# Patient Record
Sex: FEMALE | Race: BLACK OR AFRICAN AMERICAN | NOT HISPANIC OR LATINO | Employment: UNEMPLOYED | URBAN - METROPOLITAN AREA
[De-identification: names, ages, dates, MRNs, and addresses within clinical notes are randomized per-mention and may not be internally consistent; named-entity substitution may affect disease eponyms.]

---

## 2024-05-10 ENCOUNTER — HOSPITAL ENCOUNTER (EMERGENCY)
Facility: HOSPITAL | Age: 34
Discharge: HOME | End: 2024-05-10
Attending: EMERGENCY MEDICINE

## 2024-05-10 ENCOUNTER — APPOINTMENT (OUTPATIENT)
Dept: GENERAL RADIOLOGY | Facility: HOSPITAL | Age: 34
End: 2024-05-10

## 2024-05-10 VITALS
RESPIRATION RATE: 18 BRPM | BODY MASS INDEX: 37.5 KG/M2 | DIASTOLIC BLOOD PRESSURE: 78 MMHG | HEART RATE: 74 BPM | WEIGHT: 225.09 LBS | TEMPERATURE: 98.8 F | OXYGEN SATURATION: 99 % | SYSTOLIC BLOOD PRESSURE: 122 MMHG | HEIGHT: 65 IN

## 2024-05-10 DIAGNOSIS — M54.2 NECK PAIN: Primary | ICD-10-CM

## 2024-05-10 DIAGNOSIS — M54.12 CERVICAL RADICULOPATHY: ICD-10-CM

## 2024-05-10 PROCEDURE — 72050 X-RAY EXAM NECK SPINE 4/5VWS: CPT

## 2024-05-10 PROCEDURE — 97110 THERAPEUTIC EXERCISES: CPT | Performed by: PHYSICAL THERAPIST

## 2024-05-10 PROCEDURE — 99283 EMERGENCY DEPT VISIT LOW MDM: CPT

## 2024-05-10 PROCEDURE — 96372 THER/PROPH/DIAG INJ SC/IM: CPT

## 2024-05-10 PROCEDURE — 97161 PT EVAL LOW COMPLEX 20 MIN: CPT | Performed by: PHYSICAL THERAPIST

## 2024-05-10 PROCEDURE — 25010000002 KETOROLAC TROMETHAMINE PER 15 MG

## 2024-05-10 RX ORDER — KETOROLAC TROMETHAMINE 30 MG/ML
30 INJECTION, SOLUTION INTRAMUSCULAR; INTRAVENOUS ONCE
Status: COMPLETED | OUTPATIENT
Start: 2024-05-10 | End: 2024-05-10

## 2024-05-10 RX ORDER — METHOCARBAMOL 750 MG/1
750 TABLET, FILM COATED ORAL 3 TIMES DAILY PRN
Qty: 15 TABLET | Refills: 0 | Status: SHIPPED | OUTPATIENT
Start: 2024-05-10 | End: 2024-05-15

## 2024-05-10 RX ADMIN — KETOROLAC TROMETHAMINE 30 MG: 30 INJECTION, SOLUTION INTRAMUSCULAR; INTRAVENOUS at 13:50

## 2024-05-10 NOTE — ED PROVIDER NOTES
"Time: 1:32 PM EDT  Date of encounter:  5/10/2024  Independent Historian/Clinical History and Information was obtained by:   Patient    History is limited by: N/A    Chief Complaint: Neck pain      History of Present Illness:  Patient is a 33 y.o. year old female who presents to the emergency department for evaluation of neck pain that began several days ago after waking up.  Patient states she felt like she slept on her neck wrong.  Patient states she has mild tingling radiating down into her left second third and fourth fingers.  Patient denies any known injury/trauma.  Patient denies fever.  Patient denies any prior neck surgical history.    HPI    Patient Care Team  Primary Care Provider: Provider, No Known    Past Medical History:     No Known Allergies  History reviewed. No pertinent past medical history.  History reviewed. No pertinent surgical history.  History reviewed. No pertinent family history.    Home Medications:  Prior to Admission medications    Not on File        Social History:          Review of Systems:  Review of Systems   Constitutional:  Negative for chills and fever.   HENT:  Negative for congestion, rhinorrhea and sore throat.    Eyes:  Negative for pain and visual disturbance.   Respiratory:  Negative for apnea, cough, chest tightness and shortness of breath.    Cardiovascular:  Negative for chest pain and palpitations.   Gastrointestinal:  Negative for abdominal pain, diarrhea, nausea and vomiting.   Genitourinary:  Negative for difficulty urinating and dysuria.   Musculoskeletal:  Positive for neck pain. Negative for joint swelling and myalgias.   Skin:  Negative for color change.   Neurological:  Negative for seizures and headaches.   Psychiatric/Behavioral: Negative.     All other systems reviewed and are negative.       Physical Exam:  /88 (BP Location: Right arm, Patient Position: Lying)   Pulse 75   Temp 99.8 °F (37.7 °C) (Oral)   Resp 16   Ht 165.1 cm (65\")   Wt 102 kg " (225 lb 1.4 oz)   LMP 12/03/2023 Comment: Pt reports her periods are very abnormal  SpO2 98%   BMI 37.46 kg/m²     Physical Exam  Vitals and nursing note reviewed.   Constitutional:       General: She is not in acute distress.     Appearance: Normal appearance. She is not toxic-appearing.   HENT:      Head: Normocephalic and atraumatic.      Jaw: There is normal jaw occlusion.   Eyes:      General: Lids are normal.      Extraocular Movements: Extraocular movements intact.      Conjunctiva/sclera: Conjunctivae normal.      Pupils: Pupils are equal, round, and reactive to light.   Cardiovascular:      Rate and Rhythm: Normal rate and regular rhythm.      Pulses: Normal pulses.      Heart sounds: Normal heart sounds.   Pulmonary:      Effort: Pulmonary effort is normal. No respiratory distress.      Breath sounds: Normal breath sounds. No wheezing or rhonchi.   Abdominal:      General: Abdomen is flat.      Palpations: Abdomen is soft.      Tenderness: There is no abdominal tenderness. There is no guarding or rebound.   Musculoskeletal:         General: Tenderness (Mild C-spine tenderness appreciated upon palpation.) present. Normal range of motion.      Cervical back: Normal range of motion and neck supple.      Right lower leg: No edema.      Left lower leg: No edema.   Skin:     General: Skin is warm and dry.   Neurological:      Mental Status: She is alert and oriented to person, place, and time. Mental status is at baseline.   Psychiatric:         Mood and Affect: Mood normal.                  Procedures:  Procedures      Medical Decision Making:      Comorbidities that affect care:    None    External Notes reviewed:          The following orders were placed and all results were independently analyzed by me:  Orders Placed This Encounter   Procedures    XR Spine Cervical Complete 4 or 5 View    PT Consult: Eval & Treat Functional Mobility Below Baseline    PT Plan of Care Cert / Re-Cert       Medications Given  in the Emergency Department:  Medications   ketorolac (TORADOL) injection 30 mg (30 mg Intramuscular Given 5/10/24 1350)        ED Course:         Labs:    Lab Results (last 24 hours)       ** No results found for the last 24 hours. **             Imaging:    XR Spine Cervical Complete 4 or 5 View    Result Date: 5/10/2024  XR SPINE CERVICAL COMPLETE 4 OR 5 VW-  Date of Exam: 5/10/2024 2:04 PM  Indication: pain; M54.2-Cervicalgia  Comparison: None available.  Findings: No evidence of fracture. Straightening of normal cervical lordosis. No significant degenerative changes. Soft tissues are unremarkable.      Impression: No acute abnormality of the cervical spine.   Electronically Signed By-Charlie Traylor MD On:5/10/2024 2:17 PM         Differential Diagnosis and Discussion:    Neck Pain: The patient presents with neck pain. My differential diagnosis includes but is not limited to acute spinal epidural abscess, acute spinal epidural bleed, meningitis, musculoskeletal neck pain, spinal fracture, and osteoarthritis.     All X-rays impressions were independently interpreted by me.    MDM     X-ray cervical spine shows no acute osseous abnormality.  Patient had numbness and tingling in her digits consistent with cervical radiculopathy.  I will send patient home with muscle relaxers but emphasized the importance of not driving after taking muscle relaxers as it can be sedating.  Instructed patient to follow-up PCP this week.  Instructed patient to return to ED if she develops any new or worsening symptoms.  Patient states she understands and agrees with plan of care.      There was no evidence of nuchal rigidity.  Patient had full range of motion of the neck.          Patient Care Considerations:          Consultants/Shared Management Plan:    None    Social Determinants of Health:    Patient is independent, reliable, and has access to care.       Disposition and Care Coordination:    Discharged: The patient is suitable and  stable for discharge with no need for consideration of admission.    I have explained the patient´s condition, diagnoses and treatment plan based on the information available to me at this time. I have answered questions and addressed any concerns. The patient has a good  understanding of the patient´s diagnosis, condition, and treatment plan as can be expected at this point. The vital signs have been stable. The patient´s condition is stable and appropriate for discharge from the emergency department.      The patient will pursue further outpatient evaluation with the primary care physician or other designated or consulting physician as outlined in the discharge instructions. They are agreeable to this plan of care and follow-up instructions have been explained in detail. The patient has received these instructions in written format and has expressed an understanding of the discharge instructions. The patient is aware that any significant change in condition or worsening of symptoms should prompt an immediate return to this or the closest emergency department or call to 911.  I have explained discharge medications and the need for follow up with the patient/caretakers. This was also printed in the discharge instructions. Patient was discharged with the following medications and follow up:      Medication List      No changes were made to your prescriptions during this visit.      Provider, No Known  [facility]  [place] [address]    Call in 1 day  To schedule follow-up       Final diagnoses:   Cervical radiculopathy        ED Disposition       ED Disposition   Discharge    Condition   Stable    Comment   --               This medical record created using voice recognition software.             Santana Murray PA-C  05/10/24 1422

## 2024-05-10 NOTE — Clinical Note
[facility] EMERGENCY ROOM  [address] [place] [address]  Phone: [phone]  Fax: [phone]    Anca Prince was seen and treated in our emergency department on 5/10/2024.  She may return to work on 05/13/2024.         Thank you for choosing [facility].    Sami Aguilar MD

## 2024-05-10 NOTE — THERAPY EVALUATION
Patient Name: Anca Prince  : 1990    MRN: [id number]                              Today's Date: 5/10/2024       Admit Date: 5/10/2024    Visit Dx:     ICD-10-CM ICD-9-CM   1. Neck pain  M54.2 723.1     There is no problem list on file for this patient.    History reviewed. No pertinent past medical history.  History reviewed. No pertinent surgical history.   General Information       Row Name 05/10/24 1347          Physical Therapy Time and Intention    Document Type evaluation  -LR     Mode of Treatment individual therapy  -LR       Row Name 05/10/24 1347          General Information    Patient Profile Reviewed yes  -LR     Prior Level of Function independent:  -LR               User Key  (r) = Recorded By, (t) = Taken By, (c) = Cosigned By      Initials Name Provider Type    LR Nelda Monteiro, PT Physical Therapist                  History: Patient reports last Saturday she woke up with pain in her neck.  She denies any known injury and denies a history of neck pain.  She states the pain is going down around her shoulder blade and into her left arm.  She states yesterday she did have some left arm numbness.  She denies chest pain.  Her pain is currently 8/10.  She has taken ibuprofen to help with the pain.  Her pain is increased with moving her arm, moving her neck, and breathing.    Objective:  Posture: Forward head and rounded shoulder    Palpation: Tender to palpation at bilateral cervical paraspinals, left thoracic paraspinals, left SCM, left levator scapula    ROM:  Active Cervical ROM:  Flexion: 28°  Extension: 18°  L Side bendin°  R Side bendin°  L Rotation: 25°  R Rotation: 40°    Active Shoulder ROM:  L Shoulder ROM:  R Shoulder ROM:  Flexion: 90°   Flexion: WNL  Abduction: WNL  Abduction: WNL  External Rotation: WNL External Rotation: WNL  Internal Rotation: WNL Internal Rotation: WNL     Strength:  L Shoulder MMT:   R Shoulder MMT:  Flexion: 3+   Flexion: 5  Abduction:  5   Abduction: 5  External Rotation: 5  External Rotation: 5  Internal Rotation: 5  Internal Rotation: 5    L Elbow MMT:   R Elbow MMT:  Flexion: 5   Flexion: 5  Extension: 5   Extension: 5    L Wrist MMT:    R Wrist MMT:  Flexion: 5   Flexion: 5  Extension: 5   Extension: 5    Normal bilateral  strength    Special Tests:  Spurlings: Negative B  Cervical Distraction Test: Negative     Sensation: Bilateral upper extremity sensation intact to light touch    Assessment/Plan:   Pt presents with a diagnosis of neck pain and has decreased cervical ROM, cervical and thoracic musculature tightness, and decrease left UE ROM that are limiting her ability to move from supine to sitting, reach overhead, and lift.  The patient performed exercises to help improve cervical and thoracic mobility.  She was provided with a HEP handout.    Goals:   LTG 1: The patient will be independent in HEP in order to decrease pain and improve tolerance to functional activities.  STATUS: Met    Interventions:   Manual Therapy: Not performed    Therapeutic Exercises: Upper trap stretch (2X 20 seconds on L), levator stretch (2X 20 seconds on L), cervical flexion/extension (5X), SCM stretch (3X 20 seconds on L), seated thoracic extension    Modalities: Not performed     Outcome Measures       Row Name 05/10/24 1349          Optimal Instrument    Optimal Instrument Optimal - 3  -LR     Moving - lying to sitting 3  -LR     Reaching 3  -LR     Lifting 5  -LR     From the list, choose the 3 activities you would most like to be able to do without any difficulty Reaching;Moving -lying to sitting;Lifting  -LR     Total Score Optimal - 3 8  -LR       Row Name 05/10/24 1342          Functional Assessment    Outcome Measure Options Optimal Instrument  -LR               User Key  (r) = Recorded By, (t) = Taken By, (c) = Cosigned By      Initials Name Provider Type    Nelda Small PT Physical Therapist                   Time Calculation:   PT  Evaluation Complexity  History, PT Evaluation Complexity: no personal factors and/or comorbidities  Examination of Body Systems (PT Eval Complexity): 1-2 elements  Clinical Presentation (PT Evaluation Complexity): stable  Clinical Decision Making (PT Evaluation Complexity): low complexity  Overall Complexity (PT Evaluation Complexity): low complexity     PT Charges       Row Name 05/10/24 1348             Time Calculation    PT Received On 05/10/24  -LR         Timed Charges    95950 - PT Therapeutic Exercise Minutes 10  -LR         Untimed Charges    PT Eval/Re-eval Minutes 19  -LR         Total Minutes    Timed Charges Total Minutes 10  -LR      Untimed Charges Total Minutes 19  -LR       Total Minutes 29  -LR                User Key  (r) = Recorded By, (t) = Taken By, (c) = Cosigned By      Initials Name Provider Type    LR Nelda Monteiro, PT Physical Therapist                  Therapy Charges for Today       Code Description Service Date Service Provider Modifiers Qty    41463220668 HC PT THER PROC EA 15 MIN 5/10/2024 Nelda Monteiro, PT GP 1    57215697721 HC PT EVAL LOW COMPLEXITY 2 5/10/2024 Nelda Monteiro, PT GP 1            PT G-Codes  Outcome Measure Options: Optimal Instrument       Nelda Monteiro, PT  5/10/2024

## 2025-05-02 ENCOUNTER — HOSPITAL ENCOUNTER (EMERGENCY)
Facility: HOSPITAL | Age: 35
Discharge: HOME | End: 2025-05-02
Attending: EMERGENCY MEDICINE
Payer: MEDICAID

## 2025-05-02 VITALS
HEIGHT: 65 IN | RESPIRATION RATE: 18 BRPM | TEMPERATURE: 98.7 F | DIASTOLIC BLOOD PRESSURE: 70 MMHG | WEIGHT: 213.19 LBS | SYSTOLIC BLOOD PRESSURE: 143 MMHG | BODY MASS INDEX: 35.52 KG/M2 | HEART RATE: 77 BPM | OXYGEN SATURATION: 99 %

## 2025-05-02 DIAGNOSIS — Z3A.01 LESS THAN 8 WEEKS GESTATION OF PREGNANCY: ICD-10-CM

## 2025-05-02 DIAGNOSIS — N39.0 URINARY TRACT INFECTION WITHOUT HEMATURIA, SITE UNSPECIFIED: Primary | ICD-10-CM

## 2025-05-02 DIAGNOSIS — R11.0 NAUSEA: ICD-10-CM

## 2025-05-02 LAB
ALBUMIN SERPL-MCNC: 4.1 G/DL (ref 3.5–5.2)
ALBUMIN/GLOB SERPL: 1.4 G/DL
ALP SERPL-CCNC: 53 U/L (ref 39–117)
ALT SERPL W P-5'-P-CCNC: 12 U/L (ref 1–33)
ANION GAP SERPL CALCULATED.3IONS-SCNC: 11 MMOL/L (ref 5–15)
AST SERPL-CCNC: 15 U/L (ref 1–32)
BACTERIA UR QL AUTO: ABNORMAL /HPF
BASOPHILS # BLD AUTO: 0.05 10*3/MM3 (ref 0–0.2)
BASOPHILS NFR BLD AUTO: 0.6 % (ref 0–1.5)
BILIRUB SERPL-MCNC: 0.2 MG/DL (ref 0–1.2)
BILIRUB UR QL STRIP: NEGATIVE
BUN SERPL-MCNC: 7 MG/DL (ref 6–20)
BUN/CREAT SERPL: 10.9 (ref 7–25)
CALCIUM SPEC-SCNC: 8.8 MG/DL (ref 8.6–10.5)
CHLORIDE SERPL-SCNC: 100 MMOL/L (ref 98–107)
CLARITY UR: ABNORMAL
CO2 SERPL-SCNC: 23 MMOL/L (ref 22–29)
COLOR UR: YELLOW
CREAT SERPL-MCNC: 0.64 MG/DL (ref 0.57–1)
DEPRECATED RDW RBC AUTO: 38.9 FL (ref 37–54)
EGFRCR SERPLBLD CKD-EPI 2021: 119.8 ML/MIN/1.73
EOSINOPHIL # BLD AUTO: 0.13 10*3/MM3 (ref 0–0.4)
EOSINOPHIL NFR BLD AUTO: 1.5 % (ref 0.3–6.2)
ERYTHROCYTE [DISTWIDTH] IN BLOOD BY AUTOMATED COUNT: 11.8 % (ref 12.3–15.4)
GLOBULIN UR ELPH-MCNC: 2.9 GM/DL
GLUCOSE SERPL-MCNC: 84 MG/DL (ref 65–99)
GLUCOSE UR STRIP-MCNC: NEGATIVE MG/DL
HCG INTACT+B SERPL-ACNC: NORMAL MIU/ML
HCT VFR BLD AUTO: 35.7 % (ref 34–46.6)
HGB BLD-MCNC: 12.1 G/DL (ref 12–15.9)
HGB UR QL STRIP.AUTO: NEGATIVE
HYALINE CASTS UR QL AUTO: ABNORMAL /LPF
IMM GRANULOCYTES # BLD AUTO: 0.02 10*3/MM3 (ref 0–0.05)
IMM GRANULOCYTES NFR BLD AUTO: 0.2 % (ref 0–0.5)
KETONES UR QL STRIP: NEGATIVE
LEUKOCYTE ESTERASE UR QL STRIP.AUTO: ABNORMAL
LYMPHOCYTES # BLD AUTO: 2.02 10*3/MM3 (ref 0.7–3.1)
LYMPHOCYTES NFR BLD AUTO: 24 % (ref 19.6–45.3)
MCH RBC QN AUTO: 30.6 PG (ref 26.6–33)
MCHC RBC AUTO-ENTMCNC: 33.9 G/DL (ref 31.5–35.7)
MCV RBC AUTO: 90.4 FL (ref 79–97)
MONOCYTES # BLD AUTO: 0.67 10*3/MM3 (ref 0.1–0.9)
MONOCYTES NFR BLD AUTO: 8 % (ref 5–12)
NEUTROPHILS NFR BLD AUTO: 5.53 10*3/MM3 (ref 1.7–7)
NEUTROPHILS NFR BLD AUTO: 65.7 % (ref 42.7–76)
NITRITE UR QL STRIP: NEGATIVE
NRBC BLD AUTO-RTO: 0 /100 WBC (ref 0–0.2)
PH UR STRIP.AUTO: 6.5 [PH] (ref 5–8)
PLATELET # BLD AUTO: 185 10*3/MM3 (ref 140–450)
PMV BLD AUTO: 10.7 FL (ref 6–12)
POTASSIUM SERPL-SCNC: 3.5 MMOL/L (ref 3.5–5.2)
PROT SERPL-MCNC: 7 G/DL (ref 6–8.5)
PROT UR QL STRIP: NEGATIVE
RBC # BLD AUTO: 3.95 10*6/MM3 (ref 3.77–5.28)
RBC # UR STRIP: ABNORMAL /HPF
REF LAB TEST METHOD: ABNORMAL
SODIUM SERPL-SCNC: 134 MMOL/L (ref 136–145)
SP GR UR STRIP: 1.01 (ref 1–1.03)
SQUAMOUS #/AREA URNS HPF: ABNORMAL /HPF
UROBILINOGEN UR QL STRIP: ABNORMAL
WBC # UR STRIP: ABNORMAL /HPF
WBC NRBC COR # BLD AUTO: 8.42 10*3/MM3 (ref 3.4–10.8)
WHOLE BLOOD HOLD COAG: NORMAL

## 2025-05-02 PROCEDURE — 81001 URINALYSIS AUTO W/SCOPE: CPT

## 2025-05-02 PROCEDURE — 87086 URINE CULTURE/COLONY COUNT: CPT

## 2025-05-02 PROCEDURE — 25010000002 ONDANSETRON PER 1 MG

## 2025-05-02 PROCEDURE — 84702 CHORIONIC GONADOTROPIN TEST: CPT

## 2025-05-02 PROCEDURE — 80053 COMPREHEN METABOLIC PANEL: CPT

## 2025-05-02 PROCEDURE — 96374 THER/PROPH/DIAG INJ IV PUSH: CPT

## 2025-05-02 PROCEDURE — 85025 COMPLETE CBC W/AUTO DIFF WBC: CPT

## 2025-05-02 PROCEDURE — 99283 EMERGENCY DEPT VISIT LOW MDM: CPT

## 2025-05-02 RX ORDER — CEPHALEXIN 500 MG/1
500 CAPSULE ORAL 4 TIMES DAILY
Qty: 28 CAPSULE | Refills: 0 | Status: SHIPPED | OUTPATIENT
Start: 2025-05-02 | End: 2025-05-09

## 2025-05-02 RX ORDER — ONDANSETRON 2 MG/ML
4 INJECTION INTRAMUSCULAR; INTRAVENOUS ONCE
Status: COMPLETED | OUTPATIENT
Start: 2025-05-02 | End: 2025-05-02

## 2025-05-02 RX ADMIN — ONDANSETRON 4 MG: 2 INJECTION INTRAMUSCULAR; INTRAVENOUS at 11:47

## 2025-05-02 NOTE — ED PROVIDER NOTES
Time: 11:38 AM EDT  Date of encounter:  5/2/2025  Independent Historian/Clinical History and Information was obtained by:   Patient    History is limited by: N/A    Chief Complaint: nausea        History of Present Illness:  Patient is a 34 y.o. year old female who presents to the emergency department for evaluation of nausea ongoing for 4 days and dizziness that began yesterday.  Patient also reports positive pregnancy test at home 10 days ago.  Patient reports unknown last menstrual period due to irregular menstrual cycles.  Denies any abdominal pain, vaginal bleeding, vaginal discharge.  Denies any cough, congestion, fevers, chills, body aches.      Patient Care Team  Primary Care Provider: Provider, No Known    Past Medical History:     No Known Allergies  History reviewed. No pertinent past medical history.  History reviewed. No pertinent surgical history.  History reviewed. No pertinent family history.    Home Medications:  Prior to Admission medications    Not on File        Social History:   Social History     Tobacco Use    Smoking status: Never    Smokeless tobacco: Never   Substance Use Topics    Alcohol use: Not Currently         Review of Systems:  Review of Systems   Constitutional:  Negative for chills, fatigue and fever.   HENT:  Negative for ear pain, rhinorrhea and sore throat.    Eyes:  Negative for visual disturbance.   Respiratory:  Negative for cough and shortness of breath.    Cardiovascular:  Negative for chest pain.   Gastrointestinal:  Positive for nausea. Negative for abdominal pain, diarrhea and vomiting.   Genitourinary:  Negative for difficulty urinating.   Musculoskeletal:  Negative for arthralgias, back pain and myalgias.   Skin:  Negative for rash.   Neurological:  Positive for dizziness. Negative for light-headedness and headaches.   Hematological:  Negative for adenopathy.   Psychiatric/Behavioral: Negative.          Physical Exam:  /85 (BP Location: Right arm, Patient  "Position: Lying)   Pulse 65   Temp 98.7 °F (37.1 °C) (Oral)   Resp 14   Ht 165.1 cm (65\")   Wt 96.7 kg (213 lb 3 oz)   LMP  (LMP Unknown)   SpO2 100%   BMI 35.48 kg/m²     Physical Exam  Vitals and nursing note reviewed.   Constitutional:       General: She is not in acute distress.     Appearance: Normal appearance. She is not toxic-appearing.   HENT:      Head: Normocephalic and atraumatic.      Nose: Nose normal.      Mouth/Throat:      Mouth: Mucous membranes are moist.   Eyes:      Conjunctiva/sclera: Conjunctivae normal.   Cardiovascular:      Rate and Rhythm: Normal rate and regular rhythm.      Pulses: Normal pulses.      Heart sounds: Normal heart sounds.   Pulmonary:      Effort: Pulmonary effort is normal.      Breath sounds: Normal breath sounds.   Abdominal:      General: Bowel sounds are normal.      Palpations: Abdomen is soft.      Tenderness: There is no abdominal tenderness.   Musculoskeletal:         General: Normal range of motion.      Cervical back: Normal range of motion.   Skin:     General: Skin is warm and dry.   Neurological:      General: No focal deficit present.      Mental Status: She is alert and oriented to person, place, and time.   Psychiatric:         Mood and Affect: Mood normal.         Behavior: Behavior normal.         Thought Content: Thought content normal.         Judgment: Judgment normal.                    Medical Decision Making:      Comorbidities that affect care:    Pregnancy    External Notes reviewed:    None      The following orders were placed and all results were independently analyzed by me:  Orders Placed This Encounter   Procedures    Urine Culture - Urine, Urine, Clean Catch    Comprehensive Metabolic Panel    hCG, Quantitative, Pregnancy    Urinalysis With Microscopic If Indicated (No Culture) - Urine, Clean Catch    CBC Auto Differential    Urinalysis, Microscopic Only - Urine, Clean Catch    Ambulatory Referral to Obstetrics / Gynecology    CBC & " Differential    Extra Tubes    Light Blue Top       Medications Given in the Emergency Department:  Medications   ondansetron (ZOFRAN) injection 4 mg (4 mg Intravenous Given 5/2/25 1147)        ED Course:         Labs:    Lab Results (last 24 hours)       Procedure Component Value Units Date/Time    CBC & Differential [[id number]]  (Abnormal) Collected: 05/02/25 1141    Specimen: Blood from Arm, Right Updated: 05/02/25 1151    Narrative:      The following orders were created for panel order CBC & Differential.  Procedure                               Abnormality         Status                     ---------                               -----------         ------                     CBC Auto Differential[[id number]]        Abnormal            Final result                 Please view results for these tests on the individual orders.    Comprehensive Metabolic Panel [[id number]]  (Abnormal) Collected: 05/02/25 1141    Specimen: Blood from Arm, Right Updated: 05/02/25 1211     Glucose 84 mg/dL      BUN 7 mg/dL      Creatinine 0.64 mg/dL      Sodium 134 mmol/L      Potassium 3.5 mmol/L      Chloride 100 mmol/L      CO2 23.0 mmol/L      Calcium 8.8 mg/dL      Total Protein 7.0 g/dL      Albumin 4.1 g/dL      ALT (SGPT) 12 U/L      AST (SGOT) 15 U/L      Alkaline Phosphatase 53 U/L      Total Bilirubin 0.2 mg/dL      Globulin 2.9 gm/dL      A/G Ratio 1.4 g/dL      BUN/Creatinine Ratio 10.9     Anion Gap 11.0 mmol/L      eGFR 119.8 mL/min/1.73     Narrative:      GFR Categories in Chronic Kidney Disease (CKD)              GFR Category          GFR (mL/min/1.73)    Interpretation  G1                    90 or greater        Normal or high (1)  G2                    60-89                Mild decrease (1)  G3a                   45-59                Mild to moderate decrease  G3b                   30-44                Moderate to severe decrease  G4                    15-29                Severe decrease  G5                    14  or less           Kidney failure    (1)In the absence of evidence of kidney disease, neither GFR category G1 or G2 fulfill the criteria for CKD.    eGFR calculation 2021 CKD-EPI creatinine equation, which does not include race as a factor    hCG, Quantitative, Pregnancy [[id number]] Collected: 05/02/25 1141    Specimen: Blood from Arm, Right Updated: 05/02/25 1225     HCG Quantitative 97,243.00 mIU/mL     Narrative:      HCG Ranges by Gestational Age    Females - non-pregnant premenopausal   </= 1mIU/mL HCG  Females - postmenopausal               </= 7mIU/mL HCG    3 Weeks       5.4   -      72 mIU/mL  4 Weeks      10.2   -     708 mIU/mL  5 Weeks       217   -   8,245 mIU/mL  6 Weeks       152   -  32,177 mIU/mL  7 Weeks     4,059   - 153,767 mIU/mL  8 Weeks    31,366   - 149,094 mIU/mL  9 Weeks    59,109   - 135,901 mIU/mL  10 Weeks   44,186   - 170,409 mIU/mL  12 Weeks   27,107   - 201,615 mIU/mL  14 Weeks   24,302   -  93,646 mIU/mL  15 Weeks   12,540   -  69,747 mIU/mL  16 Weeks    8,904   -  55,332 mIU/mL  17 Weeks    8,240   -  51,793 mIU/mL  18 Weeks    9,649   -  55,271 mIU/mL      CBC Auto Differential [[id number]]  (Abnormal) Collected: 05/02/25 1141    Specimen: Blood from Arm, Right Updated: 05/02/25 1151     WBC 8.42 10*3/mm3      RBC 3.95 10*6/mm3      Hemoglobin 12.1 g/dL      Hematocrit 35.7 %      MCV 90.4 fL      MCH 30.6 pg      MCHC 33.9 g/dL      RDW 11.8 %      RDW-SD 38.9 fl      MPV 10.7 fL      Platelets 185 10*3/mm3      Neutrophil % 65.7 %      Lymphocyte % 24.0 %      Monocyte % 8.0 %      Eosinophil % 1.5 %      Basophil % 0.6 %      Immature Grans % 0.2 %      Neutrophils, Absolute 5.53 10*3/mm3      Lymphocytes, Absolute 2.02 10*3/mm3      Monocytes, Absolute 0.67 10*3/mm3      Eosinophils, Absolute 0.13 10*3/mm3      Basophils, Absolute 0.05 10*3/mm3      Immature Grans, Absolute 0.02 10*3/mm3      nRBC 0.0 /100 WBC     Urinalysis With Microscopic If Indicated (No Culture) - Urine,  Clean Catch [[id number]]  (Abnormal) Collected: 05/02/25 1210    Specimen: Urine, Clean Catch Updated: 05/02/25 1225     Color, UA Yellow     Appearance, UA Cloudy     pH, UA 6.5     Specific Gravity, UA 1.007     Glucose, UA Negative     Ketones, UA Negative     Bilirubin, UA Negative     Blood, UA Negative     Protein, UA Negative     Leuk Esterase, UA Large (3+)     Nitrite, UA Negative     Urobilinogen, UA 0.2 E.U./dL    Urinalysis, Microscopic Only - Urine, Clean Catch [[id number]]  (Abnormal) Collected: 05/02/25 1210    Specimen: Urine, Clean Catch Updated: 05/02/25 1225     RBC, UA 0-2 /HPF      WBC, UA 21-50 /HPF      Bacteria, UA 1+ /HPF      Squamous Epithelial Cells, UA 13-20 /HPF      Hyaline Casts, UA None Seen /LPF      Methodology Automated Microscopy    Urine Culture - Urine, Urine, Clean Catch [[id number]] Collected: 05/02/25 1210    Specimen: Urine, Clean Catch Updated: 05/02/25 1235             Imaging:    No Radiology Exams Resulted Within Past 24 Hours      Differential Diagnosis and Discussion:    Vomiting: Differential diagnosis includes but is not limited to migraine, labyrinthine disorders, psychogenic, metabolic and endocrine causes, peptic ulcer, gastric outlet obstruction, gastritis, gastroenteritis, appendicitis, intestinal obstruction, paralytic ileus, food poisoning, cholecystitis, acute hepatitis, acute pancreatitis, acute febrile illness, and myocardial infarction.    PROCEDURES:    Labs were collected in the emergency department and all labs were reviewed and interpreted by me.    No orders to display       Procedures    MDM                     Patient Care Considerations:    Considered OB ultrasound however patient not experiencing any abdominal pain, vaginal bleeding or discharge today.      Consultants/Shared Management Plan:    SHARED VISIT: I have discussed the case with my supervising physician, Dr. Aguilar who states patient may discharge home with antibiotics and follow-up with  OB. The substantive portion of the medical decision was made by the attesting physician who made or approve the management plan and will take responsibility for the patient.  Clinical findings were discussed and ultimate disposition was made in consult with supervising physician.    Social Determinants of Health:    Patient is independent, reliable, and has access to care.       Disposition and Care Coordination:    Discharged: The patient is suitable and stable for discharge with no need for consideration of admission.    I have explained the patient´s condition, diagnoses and treatment plan based on the information available to me at this time. I have answered questions and addressed any concerns. The patient has a good  understanding of the patient´s diagnosis, condition, and treatment plan as can be expected at this point. The vital signs have been stable. The patient´s condition is stable and appropriate for discharge from the emergency department.      The patient will pursue further outpatient evaluation with the primary care physician or other designated or consulting physician as outlined in the discharge instructions. They are agreeable to this plan of care and follow-up instructions have been explained in detail. The patient has received these instructions in written format and has expressed an understanding of the discharge instructions. The patient is aware that any significant change in condition or worsening of symptoms should prompt an immediate return to this or the closest emergency department or call to 911.  I have explained discharge medications and the need for follow up with the patient/caretakers. This was also printed in the discharge instructions. Patient was discharged with the following medications and follow up:      Medication List        New Prescriptions      cephalexin 500 MG capsule  Commonly known as: KEFLEX  Take 1 capsule by mouth 4 (Four) Times a Day for 7 days.                Where to Get Your Medications        These medications were sent to [facility]/pharmacy #[id number] - [place], [place] - [address] - [phone]  - [phone]  [address] [place] [address]      Hours: 24-hours Phone: [phone]   cephalexin 500 MG capsule      Provider, No Known  [facility]  [place] [address]    Go to   As needed    [facility]  [address] [place] [address]  [phone]  Schedule an appointment as soon as possible for a visit          Final diagnoses:   Urinary tract infection without hematuria, site unspecified   Less than 8 weeks gestation of pregnancy   Nausea        ED Disposition       ED Disposition   Discharge    Condition   Stable    Comment   --               This medical record created using voice recognition software.             Delia Andrews, APRN  05/02/25 9201

## 2025-05-02 NOTE — DISCHARGE INSTRUCTIONS
Someone should contact you to set up an appointment with an obstetrician.  If you not hear from them you may contact the number on this packet to schedule an appointment.  Return to the ED for any new or worsening concerning symptoms.  You have been pikes for urinary tract infection you may  at your pharmacy today and begin taking.

## 2025-05-02 NOTE — ED PROVIDER NOTES
"SHARED VISIT NOTE:    Patient is 34 y.o. year old female that presents to the ED for evaluation of pregnant with nausea vomiting.     Physical Exam    ED Course:    /78 (BP Location: Left arm, Patient Position: Sitting)   Pulse 68   Temp 98 °F (36.7 °C) (Oral)   Resp 16   Ht 165.1 cm (65\")   Wt 96.7 kg (213 lb 3 oz)   LMP  (LMP Unknown)   SpO2 99%   BMI 35.48 kg/m²   Results for orders placed or performed during the hospital encounter of 05/02/25   Comprehensive Metabolic Panel    Collection Time: 05/02/25 11:41 AM    Specimen: Arm, Right; Blood   Result Value Ref Range    Glucose 84 65 - 99 mg/dL    BUN 7 6 - 20 mg/dL    Creatinine 0.64 0.57 - 1.00 mg/dL    Sodium 134 (L) 136 - 145 mmol/L    Potassium 3.5 3.5 - 5.2 mmol/L    Chloride 100 98 - 107 mmol/L    CO2 23.0 22.0 - 29.0 mmol/L    Calcium 8.8 8.6 - 10.5 mg/dL    Total Protein 7.0 6.0 - 8.5 g/dL    Albumin 4.1 3.5 - 5.2 g/dL    ALT (SGPT) 12 1 - 33 U/L    AST (SGOT) 15 1 - 32 U/L    Alkaline Phosphatase 53 39 - 117 U/L    Total Bilirubin 0.2 0.0 - 1.2 mg/dL    Globulin 2.9 gm/dL    A/G Ratio 1.4 g/dL    BUN/Creatinine Ratio 10.9 7.0 - 25.0    Anion Gap 11.0 5.0 - 15.0 mmol/L    eGFR 119.8 >60.0 mL/min/1.73   hCG, Quantitative, Pregnancy    Collection Time: 05/02/25 11:41 AM    Specimen: Arm, Right; Blood   Result Value Ref Range    HCG Quantitative 97,243.00 mIU/mL   CBC Auto Differential    Collection Time: 05/02/25 11:41 AM    Specimen: Arm, Right; Blood   Result Value Ref Range    WBC 8.42 3.40 - 10.80 10*3/mm3    RBC 3.95 3.77 - 5.28 10*6/mm3    Hemoglobin 12.1 12.0 - 15.9 g/dL    Hematocrit 35.7 34.0 - 46.6 %    MCV 90.4 79.0 - 97.0 fL    MCH 30.6 26.6 - 33.0 pg    MCHC 33.9 31.5 - 35.7 g/dL    RDW 11.8 (L) 12.3 - 15.4 %    RDW-SD 38.9 37.0 - 54.0 fl    MPV 10.7 6.0 - 12.0 fL    Platelets 185 140 - 450 10*3/mm3    Neutrophil % 65.7 42.7 - 76.0 %    Lymphocyte % 24.0 19.6 - 45.3 %    Monocyte % 8.0 5.0 - 12.0 %    Eosinophil % 1.5 0.3 - 6.2 % "    Basophil % 0.6 0.0 - 1.5 %    Immature Grans % 0.2 0.0 - 0.5 %    Neutrophils, Absolute 5.53 1.70 - 7.00 10*3/mm3    Lymphocytes, Absolute 2.02 0.70 - 3.10 10*3/mm3    Monocytes, Absolute 0.67 0.10 - 0.90 10*3/mm3    Eosinophils, Absolute 0.13 0.00 - 0.40 10*3/mm3    Basophils, Absolute 0.05 0.00 - 0.20 10*3/mm3    Immature Grans, Absolute 0.02 0.00 - 0.05 10*3/mm3    nRBC 0.0 0.0 - 0.2 /100 WBC   Light Blue Top    Collection Time: 05/02/25 11:41 AM   Result Value Ref Range    Extra Tube Hold for add-ons.    Urinalysis With Microscopic If Indicated (No Culture) - Urine, Clean Catch    Collection Time: 05/02/25 12:10 PM    Specimen: Urine, Clean Catch   Result Value Ref Range    Color, UA Yellow Yellow, Straw    Appearance, UA Cloudy (A) Clear    pH, UA 6.5 5.0 - 8.0    Specific Gravity, UA 1.007 1.005 - 1.030    Glucose, UA Negative Negative    Ketones, UA Negative Negative    Bilirubin, UA Negative Negative    Blood, UA Negative Negative    Protein, UA Negative Negative    Leuk Esterase, UA Large (3+) (A) Negative    Nitrite, UA Negative Negative    Urobilinogen, UA 0.2 E.U./dL 0.2 - 1.0 E.U./dL   Urinalysis, Microscopic Only - Urine, Clean Catch    Collection Time: 05/02/25 12:10 PM    Specimen: Urine, Clean Catch   Result Value Ref Range    RBC, UA 0-2 None Seen, 0-2 /HPF    WBC, UA 21-50 (A) None Seen, 0-2 /HPF    Bacteria, UA 1+ (A) None Seen /HPF    Squamous Epithelial Cells, UA 13-20 (A) None Seen, 0-2 /HPF    Hyaline Casts, UA None Seen None Seen /LPF    Methodology Automated Microscopy      Medications   ondansetron (ZOFRAN) injection 4 mg (4 mg Intravenous Given 5/2/25 1147)     No results found.    MDM:    Procedures    Labs were collected in the emergency department and all labs were reviewed and interpreted by me.          SHARED VISIT ATTESTATION:    This visit was performed by both myself and an APC.  I performed the substantive portion of the medical decision making. The management plan was made  or approved by me, and I take responsibility for patient management.           Sami Aguilar MD  12:39 EDT  05/02/25         Sami Aguilar MD  05/02/25 1259

## 2025-05-02 NOTE — Clinical Note
[facility] EMERGENCY ROOM  [address] [place] [address]  Phone: [phone]  Fax: [phone]    Anca Prince was seen and treated in our emergency department on 5/2/2025.  She may return to work on 05/07/2025.         Thank you for choosing [facility].    Delia Andrews, APRN

## 2025-05-03 LAB — BACTERIA SPEC AEROBE CULT: NORMAL

## 2025-05-13 ENCOUNTER — OFFICE VISIT (OUTPATIENT)
Dept: OBSTETRICS AND GYNECOLOGY | Age: 35
End: 2025-05-13
Payer: MEDICAID

## 2025-05-13 VITALS
SYSTOLIC BLOOD PRESSURE: 116 MMHG | HEART RATE: 91 BPM | DIASTOLIC BLOOD PRESSURE: 74 MMHG | WEIGHT: 206 LBS | BODY MASS INDEX: 34.32 KG/M2 | HEIGHT: 65 IN

## 2025-05-13 DIAGNOSIS — Z32.00 ENCOUNTER FOR CONFIRMATION OF PREGNANCY TEST RESULT WITH PHYSICAL EXAMINATION: Primary | ICD-10-CM

## 2025-05-13 DIAGNOSIS — O21.9 NAUSEA AND VOMITING IN PREGNANCY: ICD-10-CM

## 2025-05-13 RX ORDER — PRENATAL VIT NO.130/IRON/FOLIC 27MG-0.8MG
1 TABLET ORAL DAILY
Qty: 30 EACH | Refills: 0 | Status: SHIPPED | OUTPATIENT
Start: 2025-05-13

## 2025-05-13 RX ORDER — ONDANSETRON 4 MG/1
4 TABLET, ORALLY DISINTEGRATING ORAL EVERY 8 HOURS PRN
Qty: 30 TABLET | Refills: 3 | Status: SHIPPED | OUTPATIENT
Start: 2025-05-13

## 2025-05-13 NOTE — PROGRESS NOTES
"[facility]  Gynecological Visit    CC: Follow-up ED    Subjective:   34 y.o. who presents in follow-up from the emergency department due to pregnancy related nausea and vomiting.  The patient denies any vaginal bleeding or pelvic pain.  She states she had a very difficult first pregnancy due to difficulty continuing work.  She denies any complication during delivery.  She reports that she is already missing work due to the nausea and vomiting and needs work restrictions.  She is also requesting accommodation for the ability to sit at work    History:   Past medical history, medications, allergies, surgical history, social history, and obstetrical history all reviewed and updated.    Last Completed Pap Smear    This patient has no relevant Health Maintenance data.       Objective:/74   Pulse 91   Ht 165.1 cm (65\")   Wt 93.4 kg (206 lb)   LMP  (LMP Unknown)   Breastfeeding No   BMI 34.28 kg/m²     Physical Exam  Vitals and nursing note reviewed.   Constitutional:       General: She is not in acute distress.     Appearance: Normal appearance. She is not ill-appearing.   HENT:      Head: Normocephalic and atraumatic.   Neck:      Thyroid: No thyroid mass or thyromegaly.   Abdominal:      General: Abdomen is flat. There is no distension.      Palpations: Abdomen is soft. There is no mass.      Tenderness: There is no abdominal tenderness. There is no guarding or rebound.   Musculoskeletal:         General: No swelling.      Right lower leg: No edema.      Left lower leg: No edema.   Skin:     General: Skin is warm and dry.      Findings: No rash.   Neurological:      Mental Status: She is alert and oriented to person, place, and time.   Psychiatric:         Mood and Affect: Mood normal.         Behavior: Behavior normal.         Thought Content: Thought content normal.     Bedside transabdominal ultrasound: There is a viable mejia intrauterine gestation noted within the uterine " cavity.  Within the uterine cavity there is a gestational sac, yolk sac and fetal pole.  Cardiac activity is noted to be normal.  No gross abnormalities are noted.    Labs 5/2/2025 reviewed including a CMP, hCG, CBC, and urinalysis    Assessment and Plan:  Diagnoses and all orders for this visit:    1. Encounter for confirmation of pregnancy test result with physical examination (Primary)  Assessment & Plan:  Today's findings were reviewed with the patient.  There is a viable mejia intrauterine gestation noted.  A prenatal vitamin was prescribed and the patient encouraged to start right away.  Schedule dating ultrasound  Schedule initial OB visit  A note was provided for restrictions including lifting restriction of no more than 35 pounds.  Push and pull restriction of no more than 50 pounds.  And accommodation was also requested to allow the patient to sit during work.    Orders:  -     US Ob < 14 Weeks Single or First Gestation; Future  -     Prenatal Vit-Fe Fumarate-FA (Prenatal Vitamins) 27-0.8 MG tablet; Take 1 tablet by mouth Daily.  Dispense: 30 each; Refill: 0    2. Nausea and vomiting in pregnancy  Assessment & Plan:  The patient started on Zofran in the ER.  She reports this is helping.  Continue Zofran 4 mg every 8 hours as needed for nausea and vomiting.  The risk, benefits, and alternatives were discussed and the patient wishes to proceed.    Orders:  -     ondansetron ODT (ZOFRAN-ODT) 4 MG disintegrating tablet; Place 1 tablet on the tongue Every 8 (Eight) Hours As Needed for Nausea or Vomiting.  Dispense: 30 tablet; Refill: 3        Follow Up:  Return in about 4 weeks (around 6/10/2025) for Inital ob.    Edgardo Millan MD  05/13/2025

## 2025-05-14 NOTE — ASSESSMENT & PLAN NOTE
The patient started on Zofran in the ER.  She reports this is helping.  Continue Zofran 4 mg every 8 hours as needed for nausea and vomiting.  The risk, benefits, and alternatives were discussed and the patient wishes to proceed.

## 2025-05-14 NOTE — ASSESSMENT & PLAN NOTE
Today's findings were reviewed with the patient.  There is a viable mejia intrauterine gestation noted.  A prenatal vitamin was prescribed and the patient encouraged to start right away.  Schedule dating ultrasound  Schedule initial OB visit  A note was provided for restrictions including lifting restriction of no more than 35 pounds.  Push and pull restriction of no more than 50 pounds.  And accommodation was also requested to allow the patient to sit during work.

## 2025-06-17 ENCOUNTER — INITIAL PRENATAL (OUTPATIENT)
Dept: OBSTETRICS AND GYNECOLOGY | Age: 35
End: 2025-06-17
Payer: MEDICAID

## 2025-06-17 VITALS — DIASTOLIC BLOOD PRESSURE: 74 MMHG | WEIGHT: 202 LBS | BODY MASS INDEX: 33.61 KG/M2 | SYSTOLIC BLOOD PRESSURE: 111 MMHG

## 2025-06-17 DIAGNOSIS — O21.9 NAUSEA AND VOMITING IN PREGNANCY: ICD-10-CM

## 2025-06-17 DIAGNOSIS — Z34.80 SUPERVISION OF OTHER NORMAL PREGNANCY, ANTEPARTUM: Primary | ICD-10-CM

## 2025-06-17 PROBLEM — Z32.00 ENCOUNTER FOR CONFIRMATION OF PREGNANCY TEST RESULT WITH PHYSICAL EXAMINATION: Status: RESOLVED | Noted: 2025-05-13 | Resolved: 2025-06-17

## 2025-06-17 LAB
ABO GROUP BLD: NORMAL
AMPHET+METHAMPHET UR QL: NEGATIVE
AMPHETAMINES UR QL: NEGATIVE
BARBITURATES UR QL SCN: NEGATIVE
BASOPHILS # BLD AUTO: 0.03 10*3/MM3 (ref 0–0.2)
BASOPHILS NFR BLD AUTO: 0.4 % (ref 0–1.5)
BENZODIAZ UR QL SCN: NEGATIVE
BLD GP AB SCN SERPL QL: NEGATIVE
BUPRENORPHINE SERPL-MCNC: NEGATIVE NG/ML
CANNABINOIDS SERPL QL: NEGATIVE
COCAINE UR QL: NEGATIVE
DEPRECATED RDW RBC AUTO: 41 FL (ref 37–54)
EOSINOPHIL # BLD AUTO: 0.07 10*3/MM3 (ref 0–0.4)
EOSINOPHIL NFR BLD AUTO: 0.9 % (ref 0.3–6.2)
ERYTHROCYTE [DISTWIDTH] IN BLOOD BY AUTOMATED COUNT: 12.2 % (ref 12.3–15.4)
FENTANYL UR-MCNC: NEGATIVE NG/ML
GLUCOSE UR STRIP-MCNC: NEGATIVE MG/DL
HCT VFR BLD AUTO: 33.3 % (ref 34–46.6)
HCV AB SER QL: NORMAL
HGB BLD-MCNC: 11.2 G/DL (ref 12–15.9)
HIV 1+2 AB+HIV1 P24 AG SERPL QL IA: NORMAL
IMM GRANULOCYTES # BLD AUTO: 0.02 10*3/MM3 (ref 0–0.05)
IMM GRANULOCYTES NFR BLD AUTO: 0.3 % (ref 0–0.5)
LYMPHOCYTES # BLD AUTO: 1.34 10*3/MM3 (ref 0.7–3.1)
LYMPHOCYTES NFR BLD AUTO: 17.4 % (ref 19.6–45.3)
MCH RBC QN AUTO: 30.9 PG (ref 26.6–33)
MCHC RBC AUTO-ENTMCNC: 33.6 G/DL (ref 31.5–35.7)
MCV RBC AUTO: 91.7 FL (ref 79–97)
METHADONE UR QL SCN: NEGATIVE
MONOCYTES # BLD AUTO: 0.52 10*3/MM3 (ref 0.1–0.9)
MONOCYTES NFR BLD AUTO: 6.7 % (ref 5–12)
NEUTROPHILS NFR BLD AUTO: 5.74 10*3/MM3 (ref 1.7–7)
NEUTROPHILS NFR BLD AUTO: 74.3 % (ref 42.7–76)
NRBC BLD AUTO-RTO: 0 /100 WBC (ref 0–0.2)
OPIATES UR QL: NEGATIVE
OXYCODONE UR QL SCN: NEGATIVE
PCP UR QL SCN: NEGATIVE
PLATELET # BLD AUTO: 183 10*3/MM3 (ref 140–450)
PMV BLD AUTO: 11.2 FL (ref 6–12)
PROT UR STRIP-MCNC: ABNORMAL MG/DL
RBC # BLD AUTO: 3.63 10*6/MM3 (ref 3.77–5.28)
RH BLD: POSITIVE
TRICYCLICS UR QL SCN: NEGATIVE
WBC NRBC COR # BLD AUTO: 7.72 10*3/MM3 (ref 3.4–10.8)

## 2025-06-17 PROCEDURE — 80307 DRUG TEST PRSMV CHEM ANLYZR: CPT | Performed by: OBSTETRICS & GYNECOLOGY

## 2025-06-17 PROCEDURE — 80081 OBSTETRIC PANEL INC HIV TSTG: CPT | Performed by: OBSTETRICS & GYNECOLOGY

## 2025-06-17 PROCEDURE — G0123 SCREEN CERV/VAG THIN LAYER: HCPCS | Performed by: OBSTETRICS & GYNECOLOGY

## 2025-06-17 PROCEDURE — 87591 N.GONORRHOEAE DNA AMP PROB: CPT | Performed by: OBSTETRICS & GYNECOLOGY

## 2025-06-17 PROCEDURE — 86803 HEPATITIS C AB TEST: CPT | Performed by: OBSTETRICS & GYNECOLOGY

## 2025-06-17 PROCEDURE — 87661 TRICHOMONAS VAGINALIS AMPLIF: CPT | Performed by: OBSTETRICS & GYNECOLOGY

## 2025-06-17 PROCEDURE — 87491 CHLMYD TRACH DNA AMP PROBE: CPT | Performed by: OBSTETRICS & GYNECOLOGY

## 2025-06-17 PROCEDURE — 83020 HEMOGLOBIN ELECTROPHORESIS: CPT | Performed by: OBSTETRICS & GYNECOLOGY

## 2025-06-17 PROCEDURE — 87086 URINE CULTURE/COLONY COUNT: CPT | Performed by: OBSTETRICS & GYNECOLOGY

## 2025-06-17 PROCEDURE — 87340 HEPATITIS B SURFACE AG IA: CPT | Performed by: OBSTETRICS & GYNECOLOGY

## 2025-06-17 PROCEDURE — 87341 HEP B SURFACE AG NEUTRLZJ IA: CPT | Performed by: OBSTETRICS & GYNECOLOGY

## 2025-06-17 NOTE — PROGRESS NOTES
[facility]  OB Initial Visit    CC: Here for initial care of pregnancy     Subjective:  34 y.o.  presenting for her first obstetrical visit.  The patient denies any vaginal bleeding or pelvic pain.  She continues to have fatigue and some nausea and vomiting.    LMP: No LMP recorded (lmp unknown). Patient is pregnant.     History:   Last Pap:   Last Completed Pap Smear    This patient has no relevant Health Maintenance data.       Past medical and surgical history, medications, allergies, social history, and OB/GYN history reviewed and updated. Preventative care history, history of abuse/safe environment, vaccine history,  genetic history reviewed and updated    OB History    Para Term  AB Living   2 1 1   1   SAB IAB Ectopic Molar Multiple Live Births        1      # Outcome Date GA Lbr [place]/2nd Weight Sex Type Anes PTL Lv   2 Current            1 Term  39w0d  3750 g (8 lb 4.3 oz) F Vag-Spont EPI N SOPHIA     Objective:  /74   Wt 91.6 kg (202 lb)   LMP  (LMP Unknown)   BMI 33.61 kg/m²     Physical Exam  Vitals and nursing note reviewed. Exam conducted with a chaperone present.   Constitutional:       General: She is not in acute distress.     Appearance: Normal appearance. She is not ill-appearing.   HENT:      Head: Normocephalic and atraumatic.      Mouth/Throat:      Mouth: Mucous membranes are moist.      Pharynx: Oropharynx is clear.   Eyes:      Extraocular Movements: Extraocular movements intact.   Cardiovascular:      Rate and Rhythm: Normal rate and regular rhythm.   Pulmonary:      Effort: Pulmonary effort is normal. No respiratory distress.      Breath sounds: Normal breath sounds. No wheezing.   Abdominal:      General: Abdomen is flat. There is no distension.      Palpations: Abdomen is soft. There is no mass.      Tenderness: There is no abdominal tenderness. There is no guarding or rebound.      Hernia: No hernia is present. There is no hernia in the  left inguinal area or right inguinal area.   Genitourinary:     General: Normal vulva.      Exam position: Lithotomy position.      Pubic Area: No rash.       Labia:         Right: No rash, tenderness, lesion or injury.         Left: No rash, tenderness, lesion or injury.       Urethra: No prolapse, urethral pain or urethral lesion.      Vagina: No signs of injury and foreign body. No vaginal discharge, erythema, tenderness or bleeding.      Cervix: No cervical motion tenderness, discharge, friability, lesion, erythema or cervical bleeding.      Uterus: Enlarged. Not fixed, not tender and no uterine prolapse.       Adnexa:         Right: No mass or tenderness.          Left: No mass or tenderness.        Comments: The uterus is gravid and approximately 14 weeks in size  Musculoskeletal:         General: No swelling.      Right lower leg: No edema.      Left lower leg: No edema.   Skin:     General: Skin is warm and dry.      Findings: No rash.   Neurological:      Mental Status: She is alert and oriented to person, place, and time.   Psychiatric:         Mood and Affect: Mood normal.         Behavior: Behavior normal.         Thought Content: Thought content normal.         Judgment: Judgment normal.       Assessment and Plan:  Diagnoses and all orders for this visit:    1. Supervision of other normal pregnancy, antepartum (Primary)  Overview:  EDC: 12/13/2025    Prenatal genetic screening: Nips    COVID-19 vaccine: Recommended  Flu vaccine: Recommended  Tdap vaccine: Recommended    Assessment & Plan:  Continue prenatal vitamins  Check prenatal labs  Reviewed today's dating ultrasound and finalized EDC  Discussed office visit schedule and call rotation  Reviewed medication safe in pregnancy  Desires nips testing  Reviewed nutrition, exercise, and appropriate weight gain in pregnancy    Orders:  -     POC Urinalysis Dipstick  -     Urine Drug Screen - Urine, Clean Catch; Future  -     Urine Culture - Urine, Urine,  Clean Catch  -     IGP,CtNgTv,rfx Aptima HPV ASCU  -     OB Panel With HIV  -     Hemoglobinopathy Fractionation [place]; Future  -     US Ob Detail Fetal Anatomy Single or First Gestation; Future  -     OtaqqasT45 PLUS Core+ESS - Blood, Arm, Right  -     Urine Drug Screen - Urine, Clean Catch  -     Hemoglobinopathy Fractionation [place]  -     Fentanyl, Urine - Urine, Clean Catch  -     Hepatitis B Surface Antigen    2. Nausea and vomiting in pregnancy  Assessment & Plan:  Symptoms are proving.  Continue Zofran 4 mg every 8 hours as needed for nausea and vomiting.        14w4d    Genetic Screening: NIPS    Vaccines: Recommend FLU vaccine this season, R/B discussed  Recommend COVID vaccine, R/B discussed    Counseling: Nutrition discussed, calories, activity/exercise in pregnancy  Discussed dietary restrictions/safety food preparation in pregnancy  Reviewed what to expect prenatal visits, office providers (female and male) and [facility]  Appropriate trimester precautions provided, N/V, vag bleeding, cramping  VACCINE importance in pregnancy discussed.  Maternal and fetal risk of not being vaccinated reviewed NLT increased risk maternal/fetal severity of illness/death, PTD, CS, hemorrhage, HTN, possible IUFD.  Significant maternal and fetal/infant benefit w vaccination.  FDA approval and ACOG/SMFM/CDC strong recommendation in pregnancy.  Questions answered.   Questions answered    Return in about 3 weeks (around 7/8/2025) for Recheck.    Edgardo Millan MD  06/17/2025

## 2025-06-18 LAB
HBV SURFACE AG SERPL QL IA: ABNORMAL
RPR SER QL: NORMAL

## 2025-06-18 NOTE — ASSESSMENT & PLAN NOTE
Continue prenatal vitamins  Check prenatal labs  Reviewed today's dating ultrasound and finalized EDC  Discussed office visit schedule and call rotation  Reviewed medication safe in pregnancy  Desires nips testing  Reviewed nutrition, exercise, and appropriate weight gain in pregnancy

## 2025-06-19 LAB
BACTERIA SPEC AEROBE CULT: NO GROWTH
C TRACH RRNA CVX QL NAA+PROBE: NEGATIVE
CONV .: NORMAL
CYTOLOGIST CVX/VAG CYTO: NORMAL
CYTOLOGY CVX/VAG DOC CYTO: NORMAL
CYTOLOGY CVX/VAG DOC THIN PREP: NORMAL
DX ICD CODE: NORMAL
HBV SURFACE AG SERPL QL IA: NORMAL
HBV SURFACE AG SERPL QL NT: POSITIVE
HGB A MFR BLD ELPH: 97 % (ref 96.4–98.8)
HGB A2 MFR BLD ELPH: 3 % (ref 1.8–3.2)
HGB F MFR BLD ELPH: 0 % (ref 0–2)
HGB FRACT BLD-IMP: NORMAL
HGB S MFR BLD ELPH: 0 %
N GONORRHOEA RRNA CVX QL NAA+PROBE: NEGATIVE
OTHER STN SPEC: NORMAL
RUBV IGG SERPL IA-ACNC: 1.28 INDEX
SERVICE CMNT-IMP: NORMAL
STAT OF ADQ CVX/VAG CYTO-IMP: NORMAL
T VAGINALIS RRNA SPEC QL NAA+PROBE: NEGATIVE

## 2025-06-20 ENCOUNTER — TELEPHONE (OUTPATIENT)
Dept: OBSTETRICS AND GYNECOLOGY | Age: 35
End: 2025-06-20
Payer: MEDICAID

## 2025-06-20 NOTE — TELEPHONE ENCOUNTER
Spoke to Kait at [facility]. She wanted to see if the patient was aware of her results before she reached out with information on a Hep B program they offer. Advised patient had not been contacted yet but would call her next to try to go over her results. Advised Kait I will call her back once I have spoke to the patient so she can reach out to her with information.

## 2025-06-20 NOTE — TELEPHONE ENCOUNTER
Provider: DR. GARCIA    Caller: [facility]    Relationship to Patient:     Pharmacy:     Phone Number: [phone]    Reason for Call: COULD SOMEONE FROM CLINICAL PLS CONTACT HEALTH DEPT  NURSE COORDINATOR AT # ABOVE?

## 2025-06-21 LAB
5P15 DELETION (CRI-DU-CHAT): NOT DETECTED
CFDNA.FET/CFDNA.TOTAL SFR FETUS: NORMAL %
CITATION REF LAB TEST: NORMAL
FET 13+18+21+X+Y ANEUP PLAS.CFDNA: NEGATIVE
FET 1P36 DEL RISK WBC.DNA+CFDNA QL: NOT DETECTED
FET 22Q11.2 DEL RISK WBC.DNA+CFDNA QL: NOT DETECTED
FET CHR 11Q23 DEL PLAS.CFDNA QL: NOT DETECTED
FET CHR 15Q11 DEL PLAS.CFDNA QL: NOT DETECTED
FET CHR 21 TS PLAS.CFDNA QL: NEGATIVE
FET CHR 4P16 DEL PLAS.CFDNA QL: NOT DETECTED
FET CHR 8Q24 DEL PLAS.CFDNA QL: NOT DETECTED
FET SEX PLAS.CFDNA DOSAGE CFDNA: NORMAL
FET TS 13 RISK PLAS.CFDNA QL: NEGATIVE
FET TS 18 RISK WBC.DNA+CFDNA QL: NEGATIVE
GA EST FROM CONCEPTION DATE: NORMAL D
GESTATIONAL AGE > 9:: YES
LAB DIRECTOR NAME PROVIDER: NORMAL
LAB DIRECTOR NAME PROVIDER: NORMAL
LABORATORY COMMENT REPORT: NORMAL
LIMITATIONS OF THE TEST: NORMAL
NEGATIVE PREDICTIVE VALUE: NORMAL
PERFORMANCE CHARACTERISTICS: NORMAL
POSITIVE PREDICTIVE VALUE: NORMAL
REF LAB TEST METHOD: NORMAL
SERVICE CMNT-IMP: NORMAL
TEST PERFORMANCE INFO SPEC: NORMAL
TRIOSOMY 16: NOT DETECTED
TRISOMY 22: NOT DETECTED

## 2025-06-23 ENCOUNTER — TELEPHONE (OUTPATIENT)
Dept: OBSTETRICS AND GYNECOLOGY | Age: 35
End: 2025-06-23
Payer: MEDICAID

## 2025-06-23 NOTE — TELEPHONE ENCOUNTER
Left a message for Kait letting her know I have spoke to the patient and she is aware of her diagnosis and that she will be calling with program information.

## 2025-06-23 NOTE — TELEPHONE ENCOUNTER
Caller: JAREN    Relationship to patient: HEALTH DEPARTMENT    Best call back number: [phone]    Patient is needing: JAREN FROM THE HEALTH DEPARTMENT SAYS SHE IS GIVING OLEG A CALL BACK TO LET HER KNOW SHE DID RECEIVE HER VOICEMAIL.

## 2025-07-14 ENCOUNTER — ROUTINE PRENATAL (OUTPATIENT)
Dept: OBSTETRICS AND GYNECOLOGY | Age: 35
End: 2025-07-14
Payer: MEDICAID

## 2025-07-14 VITALS — DIASTOLIC BLOOD PRESSURE: 68 MMHG | BODY MASS INDEX: 34.45 KG/M2 | SYSTOLIC BLOOD PRESSURE: 103 MMHG | WEIGHT: 207 LBS

## 2025-07-14 DIAGNOSIS — B19.10 HEPATITIS B VIRUS INFECTION AFFECTING PREGNANCY, ANTEPARTUM: ICD-10-CM

## 2025-07-14 DIAGNOSIS — Z34.80 SUPERVISION OF OTHER NORMAL PREGNANCY, ANTEPARTUM: Primary | ICD-10-CM

## 2025-07-14 DIAGNOSIS — O98.419 HEPATITIS B VIRUS INFECTION AFFECTING PREGNANCY, ANTEPARTUM: ICD-10-CM

## 2025-07-14 LAB
ALBUMIN SERPL-MCNC: 3.5 G/DL (ref 3.5–5.2)
ALBUMIN/GLOB SERPL: 1.2 G/DL
ALP SERPL-CCNC: 44 U/L (ref 39–117)
ALT SERPL W P-5'-P-CCNC: 15 U/L (ref 1–33)
ANION GAP SERPL CALCULATED.3IONS-SCNC: 11 MMOL/L (ref 5–15)
AST SERPL-CCNC: 19 U/L (ref 1–32)
BILIRUB SERPL-MCNC: 0.2 MG/DL (ref 0–1.2)
BUN SERPL-MCNC: 5 MG/DL (ref 6–20)
BUN/CREAT SERPL: 8.8 (ref 7–25)
CALCIUM SPEC-SCNC: 8.9 MG/DL (ref 8.6–10.5)
CHLORIDE SERPL-SCNC: 104 MMOL/L (ref 98–107)
CO2 SERPL-SCNC: 21 MMOL/L (ref 22–29)
CREAT SERPL-MCNC: 0.57 MG/DL (ref 0.57–1)
EGFRCR SERPLBLD CKD-EPI 2021: 123.2 ML/MIN/1.73
GLOBULIN UR ELPH-MCNC: 3 GM/DL
GLUCOSE SERPL-MCNC: 85 MG/DL (ref 65–99)
GLUCOSE UR STRIP-MCNC: NEGATIVE MG/DL
POTASSIUM SERPL-SCNC: 3.5 MMOL/L (ref 3.5–5.2)
PROT SERPL-MCNC: 6.5 G/DL (ref 6–8.5)
PROT UR STRIP-MCNC: NEGATIVE MG/DL
SODIUM SERPL-SCNC: 136 MMOL/L (ref 136–145)

## 2025-07-14 PROCEDURE — 87517 HEPATITIS B DNA QUANT: CPT | Performed by: OBSTETRICS & GYNECOLOGY

## 2025-07-14 PROCEDURE — 80053 COMPREHEN METABOLIC PANEL: CPT | Performed by: OBSTETRICS & GYNECOLOGY

## 2025-07-14 NOTE — PROGRESS NOTES
[facility]  OB Follow Up Visit    CC: Routine obstetrical visit    Prenatal care complicated by:  Patient Active Problem List   Diagnosis    Supervision of other normal pregnancy, antepartum    Hepatitis B virus infection affecting pregnancy, antepartum     Subjective:   Anca Prince is a 34 y.o.  18w2d patient being seen today for her obstetrical follow up visit. The patient has: No complaints, No leaking fluid, No vaginal bleeding, Adequate FM  The patient reports that her nausea is much improved.  She is having some mild low back pain occasionally.    History: Past medical and surgical history, medications, allergies, social history, and obstetrical history all reviewed and updated.    Objective:    Urine glucose/protein - See OB flow sheet      /68   Wt 93.9 kg (207 lb)   LMP  (LMP Unknown)   BMI 34.45 kg/m²     General exam: Comfortable, NAD  FHR: 158 BPM   Uterine Size: size equals dates  Pelvic Exam: No    Assessment and Plan:  Diagnoses and all orders for this visit:    1. Supervision of other normal pregnancy, antepartum (Primary)  Overview:  EDC: 2025    Prenatal genetic screening: Nips negative    COVID-19 vaccine: Recommended  Flu vaccine: Recommended  Tdap vaccine: Recommended    Assessment & Plan:  The labs were reviewed  Nips results were reviewed  Continue prenatal vitamin      Orders:  -     POC Urinalysis Dipstick    2. Hepatitis B virus infection affecting pregnancy, antepartum  Overview:  GI referral: Done  MFM referral: Done  Hepatitis B viral load:    Assessment & Plan:  I discussed the patient's diagnosis of hepatitis B in great detail.  We discussed risks of hepatitis B both during pregnancy and outside of pregnancy.  We discussed management of the infant postdelivery including postexposure prophylaxis as well as vaccination for hepatitis B.  We discussed that despite these measures there are still risks of transmitting hepatitis B to the infant  during the delivery process.  Check CMP.  Check hep B viral load.  Referred to GI for assistance with management of hepatitis B both during the pregnancy and for long-term follow-up.  Refer to [facility] for additional pregnancy recommendations.    Orders:  -     Comprehensive Metabolic Panel; Future  -     Hepatitis B DNA, Quantitative, PCR; Future  -     Ambulatory Referral to Gastroenterology  -     Comprehensive Metabolic Panel  -     Hepatitis B DNA, Quantitative, PCR      18w2d  Reassuring pregnancy progress    Counseling: Second trimester precautions  OB precautions, leaking, VB, medardo sanders vs PTL/Labor    Questions answered    Return in about 4 weeks (around 8/11/2025) for Recheck.    Edgardo Millan MD  07/14/2025

## 2025-07-15 PROBLEM — O21.9 NAUSEA AND VOMITING IN PREGNANCY: Status: RESOLVED | Noted: 2025-05-13 | Resolved: 2025-07-15

## 2025-07-15 NOTE — ASSESSMENT & PLAN NOTE
I discussed the patient's diagnosis of hepatitis B in great detail.  We discussed risks of hepatitis B both during pregnancy and outside of pregnancy.  We discussed management of the infant postdelivery including postexposure prophylaxis as well as vaccination for hepatitis B.  We discussed that despite these measures there are still risks of transmitting hepatitis B to the infant during the delivery process.  Check CMP.  Check hep B viral load.  Referred to GI for assistance with management of hepatitis B both during the pregnancy and for long-term follow-up.  Refer to [facility] for additional pregnancy recommendations.

## 2025-07-16 LAB
HBV DNA SERPL NAA+PROBE-ACNC: 180 IU/ML
HBV DNA SERPL NAA+PROBE-LOG IU: 2.25 LOG10 IU/ML
REF LAB TEST REF RANGE: NORMAL

## 2025-08-05 ENCOUNTER — ROUTINE PRENATAL (OUTPATIENT)
Dept: OBSTETRICS AND GYNECOLOGY | Age: 35
End: 2025-08-05
Payer: COMMERCIAL

## 2025-08-05 VITALS — DIASTOLIC BLOOD PRESSURE: 74 MMHG | BODY MASS INDEX: 33.61 KG/M2 | WEIGHT: 202 LBS | SYSTOLIC BLOOD PRESSURE: 111 MMHG

## 2025-08-05 DIAGNOSIS — O98.419 HEPATITIS B VIRUS INFECTION AFFECTING PREGNANCY, ANTEPARTUM: ICD-10-CM

## 2025-08-05 DIAGNOSIS — B19.10 HEPATITIS B VIRUS INFECTION AFFECTING PREGNANCY, ANTEPARTUM: ICD-10-CM

## 2025-08-05 DIAGNOSIS — Z34.80 SUPERVISION OF OTHER NORMAL PREGNANCY, ANTEPARTUM: Primary | ICD-10-CM

## 2025-08-05 LAB
GLUCOSE UR STRIP-MCNC: NEGATIVE MG/DL
PROT UR STRIP-MCNC: NEGATIVE MG/DL

## 2025-08-18 ENCOUNTER — TRANSCRIBE ORDERS (OUTPATIENT)
Dept: ULTRASOUND IMAGING | Facility: HOSPITAL | Age: 35
End: 2025-08-18
Payer: COMMERCIAL

## 2025-08-18 DIAGNOSIS — B19.10 HEPATITIS B VIRUS INFECTION AFFECTING PREGNANCY, ANTEPARTUM: Primary | ICD-10-CM

## 2025-08-18 DIAGNOSIS — O98.419 HEPATITIS B VIRUS INFECTION AFFECTING PREGNANCY, ANTEPARTUM: Primary | ICD-10-CM

## 2025-08-26 ENCOUNTER — HOSPITAL ENCOUNTER (OUTPATIENT)
Dept: ULTRASOUND IMAGING | Facility: HOSPITAL | Age: 35
Discharge: HOME | End: 2025-08-26
Admitting: OBSTETRICS & GYNECOLOGY
Payer: COMMERCIAL

## 2025-08-26 ENCOUNTER — OFFICE VISIT (OUTPATIENT)
Dept: OBSTETRICS AND GYNECOLOGY | Facility: CLINIC | Age: 35
End: 2025-08-26
Payer: COMMERCIAL

## 2025-08-26 VITALS
WEIGHT: 200.3 LBS | DIASTOLIC BLOOD PRESSURE: 69 MMHG | OXYGEN SATURATION: 100 % | SYSTOLIC BLOOD PRESSURE: 107 MMHG | BODY MASS INDEX: 33.37 KG/M2 | TEMPERATURE: 98.3 F | HEART RATE: 74 BPM | HEIGHT: 65 IN

## 2025-08-26 DIAGNOSIS — O98.419 HEPATITIS B VIRUS INFECTION AFFECTING PREGNANCY, ANTEPARTUM: Primary | ICD-10-CM

## 2025-08-26 DIAGNOSIS — B19.10 HEPATITIS B VIRUS INFECTION AFFECTING PREGNANCY, ANTEPARTUM: Primary | ICD-10-CM

## 2025-08-26 DIAGNOSIS — B19.10 HEPATITIS B VIRUS INFECTION AFFECTING PREGNANCY, ANTEPARTUM: ICD-10-CM

## 2025-08-26 DIAGNOSIS — O98.419 HEPATITIS B VIRUS INFECTION AFFECTING PREGNANCY, ANTEPARTUM: ICD-10-CM

## 2025-08-26 PROCEDURE — 76811 OB US DETAILED SNGL FETUS: CPT

## 2025-08-27 ENCOUNTER — PATIENT ROUNDING (BHMG ONLY) (OUTPATIENT)
Dept: OBSTETRICS AND GYNECOLOGY | Facility: CLINIC | Age: 35
End: 2025-08-27
Payer: COMMERCIAL